# Patient Record
Sex: FEMALE | Race: WHITE | NOT HISPANIC OR LATINO | Employment: FULL TIME | URBAN - METROPOLITAN AREA
[De-identification: names, ages, dates, MRNs, and addresses within clinical notes are randomized per-mention and may not be internally consistent; named-entity substitution may affect disease eponyms.]

---

## 2023-04-25 ENCOUNTER — OFFICE VISIT (OUTPATIENT)
Dept: OBSTETRICS AND GYNECOLOGY | Facility: CLINIC | Age: 28
End: 2023-04-25
Payer: COMMERCIAL

## 2023-04-25 VITALS
DIASTOLIC BLOOD PRESSURE: 85 MMHG | SYSTOLIC BLOOD PRESSURE: 125 MMHG | HEIGHT: 62 IN | WEIGHT: 240.8 LBS | BODY MASS INDEX: 44.31 KG/M2

## 2023-04-25 DIAGNOSIS — Z11.3 SCREEN FOR SEXUALLY TRANSMITTED DISEASES: Primary | ICD-10-CM

## 2023-04-25 RX ORDER — BUPROPION HYDROCHLORIDE 100 MG/1
100 TABLET ORAL DAILY
COMMUNITY
Start: 2022-02-18

## 2023-04-25 RX ORDER — LEVONORGESTREL 52 MG/1
INTRAUTERINE DEVICE INTRAUTERINE
COMMUNITY

## 2023-04-25 NOTE — PROGRESS NOTES
Chief Complaint  New Gyn (Patient is here today for STD testing. )    Subjective        Sharri Callaway presents to [facility]  History of Present Illness  Most recent Pap smear was September 2022 by Dr. Edward at [facility].  That was normal Pap smear.  Patient has a new sexual partner and requests STD testing today.  Patient has a Mirena IUD for contraception.  She has no periods with this form of birth control.  She has generally been happy with it with the exception of being concerned about weight gain since it was placed.  She also started on Wellbutrin about a year ago, so she is unsure if 1 medication may be more likely to be causing weight gain and the other.  She says that overall she has been more happy with the IUD than not, so she would like to keep it.    Past Medical History:   Diagnosis Date   • Chlamydia 01/2012    Treated.   • Depression 01/2014   • Migraine 01/2020       Past Surgical History:   Procedure Laterality Date   • WISDOM TOOTH EXTRACTION  2012       Social History     Tobacco Use   • Smoking status: Never   Vaping Use   • Vaping Use: Never used   Substance Use Topics   • Alcohol use: Not Currently     Comment: Social drinking   • Drug use: Never       Family History   Problem Relation Age of Onset   • Diabetes Father    • Breast cancer Neg Hx    • Ovarian cancer Neg Hx    • Uterine cancer Neg Hx    • Colon cancer Neg Hx        Current Outpatient Medications on File Prior to Visit   Medication Sig   • buPROPion (WELLBUTRIN) 100 MG tablet Take 1 tablet by mouth Daily.   • Levonorgestrel (Mirena, 52 MG,) 20 MCG/DAY intrauterine device IUD Mirena 20 mcg/24 hours (7 yrs) 52 mg intrauterine device   Take 1 device by intrauterine route.     No current facility-administered medications on file prior to visit.       No Known Allergies    ROS:  Review of systems:  Constitutional: No fevers, chills, sweats   Eye: No recent visual problems, denies blurry vision  "  HEENT: No ear pain, nasal congestion, sore throat, voice changes   Respiratory: No shortness of breath, cough, pain on breathing   Cardiovascular: No Chest pain, palpitations   Gastrointestinal: No nausea, vomiting, diarrhea, constipation   Genitourinary: No hematuria, dysuria, lesions on genitalia   Hema/Lymph: Negative for bruising, no edema   Endocrine: Negative for excessive thirst, excessive hunger, heat or cold intolerance   Musculoskeletal: No joint pain, muscle pain, decreased range of motion   Integumentary: No rash, pruritus, abrasions, lesions   Neurologic: No weakness, numbness, headaches   Psychiatric: No anxiety, depression, mood changes         Objective   Vital Signs:  /85   Ht 157.5 cm (62\")   Wt 109 kg (240 lb 12.8 oz)   BMI 44.04 kg/m²   Estimated body mass index is 44.04 kg/m² as calculated from the following:    Height as of this encounter: 157.5 cm (62\").    Weight as of this encounter: 109 kg (240 lb 12.8 oz).             Physical Exam   Gen: No acute distress, awake and oriented times three  Abdomen: soft, nontender, no masses or hernia, no hepatosplenomegaly, non distended, normoactive bowel sounds  Pelvic: Exam performed in the presence of a female chaperone  Patient has provided verbal consent to proceed with exam.  Normal external female genitalia, no lesions  Urethra: Normal meatus, no caruncle  External anal exam: Normal appearance, no lesions or hemorrhoids  Rectal: Deferred  Psych: Good judgement and insight, normal affect and mood      Result Review :          Pap (9/2022):  GYNECOLOGIC CYTOLOGY REPORT       DIAGNOSIS:     NEGATIVE (No evidence of intraepithelial lesions or malignancy)     Inflammation       SPECIMEN ADEQUACY:   Satisfactory for evaluation: Endocervical cells present.        **Electronically Signed Out By  Jael Parson on 10/4/2022 **            Assessment and Plan   Diagnoses and all orders for this visit:    1. Screen for sexually transmitted diseases " (Primary)  -     Cancel: HIV-1 / O / 2 Ag / Antibody 4th Generation  -     Cancel: Hepatitis B Surface Antigen  -     Cancel: HCV Antibody Rfx To Qnt PCR  -     Cancel: RPR  -     Chlamydia trachomatis, Neisseria gonorrhoeae, Trichomonas vaginalis, PCR - Swab, Vagina    Gonorrhea and Chlamydia cultures performed per request of the patient.  Patient declines STD blood work.  Safe sex practices encouraged.  She will be due for annual exam in September 2023.  She may return at that time for annual exam or sooner as needed         Follow Up   Return in about 6 months (around 10/25/2023) for Annual physical.  Patient was given instructions and counseling regarding her condition or for health maintenance advice. Please see specific information pulled into the AVS if appropriate.

## 2023-04-27 ENCOUNTER — PATIENT ROUNDING (BHMG ONLY) (OUTPATIENT)
Dept: OBSTETRICS AND GYNECOLOGY | Facility: CLINIC | Age: 28
End: 2023-04-27
Payer: COMMERCIAL

## 2023-04-27 ENCOUNTER — PATIENT MESSAGE (OUTPATIENT)
Dept: OBSTETRICS AND GYNECOLOGY | Facility: CLINIC | Age: 28
End: 2023-04-27
Payer: COMMERCIAL

## 2023-04-27 LAB
C TRACH RRNA SPEC QL NAA+PROBE: NEGATIVE
N GONORRHOEA RRNA SPEC QL NAA+PROBE: NEGATIVE
T VAGINALIS RRNA SPEC QL NAA+PROBE: NEGATIVE

## 2024-03-08 ENCOUNTER — OFFICE VISIT (OUTPATIENT)
Dept: OBSTETRICS AND GYNECOLOGY | Facility: CLINIC | Age: 29
End: 2024-03-08
Payer: COMMERCIAL

## 2024-03-08 VITALS
HEIGHT: 62 IN | WEIGHT: 236 LBS | DIASTOLIC BLOOD PRESSURE: 87 MMHG | BODY MASS INDEX: 43.43 KG/M2 | SYSTOLIC BLOOD PRESSURE: 137 MMHG

## 2024-03-08 DIAGNOSIS — Z12.4 SCREENING FOR MALIGNANT NEOPLASM OF CERVIX: ICD-10-CM

## 2024-03-08 DIAGNOSIS — Z11.3 SCREEN FOR SEXUALLY TRANSMITTED DISEASES: ICD-10-CM

## 2024-03-08 DIAGNOSIS — Z01.411 ENCOUNTER FOR GYNECOLOGICAL EXAMINATION WITH ABNORMAL FINDING: Primary | ICD-10-CM

## 2024-03-08 NOTE — PROGRESS NOTES
"Chief Complaint  Annual Exam (Patient is here for annual )    Subjective        Sharri Callaway presents to [facility]  History of Present Illness  Patient is here for her annual exam.  She is gynecologic complaints at this time.  She has a Mirena IUD which she believes was placed in 2021 when she was in [place].  She has amenorrhea with the Mirena.  She has been very happy with it and would like to continue it.  Last Pap smear was September 2022 at [place] and was normal.  Patient declines STD testing today.  She has not had any change in sexual partner.    No LMP recorded. (Menstrual status: Other).    The following portions of the patient's history were reviewed and updated as appropriate: allergies, current medications, past family history, past medical history, past social history, past surgical history, and problem list.    Objective   Vital Signs:  /87   Ht 157.5 cm (62.01\")   Wt 107 kg (236 lb)   BMI 43.15 kg/m²   Estimated body mass index is 43.15 kg/m² as calculated from the following:    Height as of this encounter: 157.5 cm (62.01\").    Weight as of this encounter: 107 kg (236 lb).             Physical Exam   Exam performed in the presence of a female chaperone  Patient has provided verbal consent to proceed with exam.    Gen: No acute distress, awake and oriented times three  HENT: Normocephalic, atraumatic, Moist mucous membranes  Eyes: PERRLA, EOMI  Lungs: Normal work of breathing, lungs clear bilaterally  Breast: Symmetrical. No skin changes or nipple retractions. No lumps or masses bilaterally. No tenderness bilaterally.  Abdomen: soft, nontender, no masses or hernia, non distended, normoactive bowel sounds  Normal external female genitalia, no lesions  Urethra: Normal meatus, no caruncle  Bladder: nontender  Vagina: No blood or discharge  Cervix: No cervical motion tenderness, no lesions, no active bleeding, nonfriable, IUD strings noted at os, normal " length  Uterus: Anteverted, normal size and shape, nontender  Adnexa: No masses or tenderness  External anal exam: Normal appearance, no lesions or hemorrhoids  Rectal: Deferred  Skin: Warm and dry, no rashes  Psych: Good judgement and insight, normal affect and mood  Neuro: CN 2-12 intact, no gross deficits    Result Review :            Pap (9/2022):  DIAGNOSIS:     NEGATIVE (No evidence of intraepithelial lesions or malignancy)     Inflammation       SPECIMEN ADEQUACY:   Satisfactory for evaluation: Endocervical cells present.            Assessment and Plan     Diagnoses and all orders for this visit:    1. Encounter for gynecological examination with abnormal finding (Primary)    Pap -not indicated this year.  She will be due for the next Pap smear after September 2025.  STD screening -  Labs offered to pt and patient declines.  Contraception - Options discussed with pt at length. Risks, benefits, side effects, and alternatives to various forms of hormonal, nonhormonal and barrier methods discussed. Pt elects continue Mirena.   Safe sex practices encouraged with patient.  Breast cancer screening. Patient encouraged to perform routine self breast exams. Recommend yearly clinical breast exam and mammogram starting at age 40.  Recommend pt take calcium and vitamin D supplementation as well as prenatal vitamin or folic acid if she is attempting to conceive.  Encourage aerobic exercise with at least 30 minutes 5 days/wk.  Avoid excessive alcohol use.  Patient is advised to call the office for results after 1 week if she has not seen or heard the results of any tests ordered or performed today.             Follow Up     Return 9/2025 for annual.  Patient was given instructions and counseling regarding her condition or for health maintenance advice. Please see specific information pulled into the AVS if appropriate.